# Patient Record
Sex: MALE | Race: WHITE | NOT HISPANIC OR LATINO | Employment: FULL TIME | URBAN - METROPOLITAN AREA
[De-identification: names, ages, dates, MRNs, and addresses within clinical notes are randomized per-mention and may not be internally consistent; named-entity substitution may affect disease eponyms.]

---

## 2017-05-19 ENCOUNTER — OFFICE VISIT (OUTPATIENT)
Dept: UROLOGY | Facility: CLINIC | Age: 42
End: 2017-05-19
Payer: COMMERCIAL

## 2017-05-19 VITALS
TEMPERATURE: 99 F | HEART RATE: 70 BPM | DIASTOLIC BLOOD PRESSURE: 90 MMHG | SYSTOLIC BLOOD PRESSURE: 144 MMHG | HEIGHT: 71 IN | BODY MASS INDEX: 33.88 KG/M2 | WEIGHT: 242 LBS

## 2017-05-19 DIAGNOSIS — N47.1 PHIMOSIS: Primary | ICD-10-CM

## 2017-05-19 DIAGNOSIS — E11.628 TYPE 2 DIABETES MELLITUS WITH OTHER SKIN COMPLICATION: ICD-10-CM

## 2017-05-19 DIAGNOSIS — I10 ESSENTIAL HYPERTENSION: ICD-10-CM

## 2017-05-19 PROCEDURE — 1160F RVW MEDS BY RX/DR IN RCRD: CPT | Mod: S$GLB,,, | Performed by: UROLOGY

## 2017-05-19 PROCEDURE — 3080F DIAST BP >= 90 MM HG: CPT | Mod: S$GLB,,, | Performed by: UROLOGY

## 2017-05-19 PROCEDURE — 99204 OFFICE O/P NEW MOD 45 MIN: CPT | Mod: 25,S$GLB,, | Performed by: UROLOGY

## 2017-05-19 PROCEDURE — 87086 URINE CULTURE/COLONY COUNT: CPT

## 2017-05-19 PROCEDURE — 4010F ACE/ARB THERAPY RXD/TAKEN: CPT | Mod: S$GLB,,, | Performed by: UROLOGY

## 2017-05-19 PROCEDURE — 99999 PR PBB SHADOW E&M-EST. PATIENT-LVL III: CPT | Mod: PBBFAC,,, | Performed by: UROLOGY

## 2017-05-19 PROCEDURE — 3077F SYST BP >= 140 MM HG: CPT | Mod: S$GLB,,, | Performed by: UROLOGY

## 2017-05-19 PROCEDURE — 51798 US URINE CAPACITY MEASURE: CPT | Mod: S$GLB,,, | Performed by: UROLOGY

## 2017-05-19 RX ORDER — BETAMETHASONE DIPROPIONATE 0.5 MG/G
OINTMENT TOPICAL 2 TIMES DAILY
Qty: 1 TUBE | Refills: 0 | Status: SHIPPED | OUTPATIENT
Start: 2017-05-19 | End: 2017-05-29

## 2017-05-19 NOTE — PROGRESS NOTES
"HPI:  Bradley Martines is a 42 y.o. year old male that  presents with No chief complaint on file.  .  Patient refers himself to the office with foreskin problems.  He states that he has intermittent tightness in cracking of his foreskin.  He states this is been going on for 8 months..  He has no dysuria.  He does have history diabetes and is been on medication for this for 5 years    He has an okay strength of stream with occasional hesitancy..  Bladder scan postvoid residual the office today is okay at 28 cc    Chart review shows a GFR greater than 60 in November 2011.  In 2009 PSA was 0.21 and in 2009 urine culture was negative      Past Medical History:   Diagnosis Date    Allergy     Hypertension     Urinary tract infection      Social History     Social History    Marital status:      Spouse name: N/A    Number of children: N/A    Years of education: N/A     Occupational History    Not on file.     Social History Main Topics    Smoking status: Never Smoker    Smokeless tobacco: Not on file    Alcohol use Yes    Drug use: No    Sexual activity: Yes     Partners: Female     Other Topics Concern    Not on file     Social History Narrative    No narrative on file     History reviewed. No pertinent surgical history.  Family History   Problem Relation Age of Onset    Prostate cancer Neg Hx     Kidney disease Neg Hx            Review of Systems  The patient has no chest pains.  The patient has no shortness of breath  Patient wears  no     glasses.  All other review of systems are negative.      Physical Exam:  BP (!) 144/90  Pulse 70  Temp 98.8 °F (37.1 °C)  Ht 5' 11" (1.803 m)  Wt 109.8 kg (242 lb)  BMI 33.75 kg/m2  General appearance: alert, cooperative, no distress  Constitutional:Oriented to person, place, and time.appears well-developed and well-nourished.   HEENT: Normocephalic, atraumatic, neck symmetrical, no nasal discharge   Eyes: conjunctivae/corneas clear, PERRL, EOM's " intact  Lungs: clear to auscultation bilaterally, no dullness to percussion bilaterally  Heart: regular rate and rhythm without rub; no displacement of the PMI   Abdomen: soft, non-tender; bowel sounds normoactive; no organomegaly  :Penis/perineum without lesions, scrotum without rash/cysts, epididymis nontender bilaterally, urethral meatus in normal location normal size, no penile plaques palpated, prostate:     Smooth and small                  seminal vesicles not palpated.  No rectal masses, sphincter tone normal.  Testes equal in size without masses.  Minimal tightness of foreskin with no evidence of balanitis  Extremities: extremities symmetric; no clubbing, cyanosis, or edema  Integument: Skin color, texture, turgor normal; no rashes; hair distrubution normal  Neurologic: Alert and oriented X 3, normal strength, normal coordination and gait  Psychiatric: no pressured speech; normal affect; no evidence of impaired cognition     LABS:    Complete Blood Count  Lab Results   Component Value Date    RBC 5.82 06/19/2009    HGB 16.8 06/19/2009    HCT 49.6 06/19/2009    MCV 85.2 06/19/2009    MCH 28.9 06/19/2009    MCHC 33.9 06/19/2009    RDW 12.5 06/19/2009     06/19/2009    MPV 9.8 06/19/2009    GRAN 7.2 06/19/2009    GRAN 61.2 06/19/2009    LYMPH 31.7 06/19/2009    LYMPH 3.7 06/19/2009    MONO 4.8 06/19/2009    MONO 0.6 06/19/2009    EOS 0.2 06/19/2009    BASO 0.1 06/19/2009    EOSINOPHIL 1.5 06/19/2009    BASOPHIL 0.8 06/19/2009       Comprehensive Metabolic Panel  Lab Results   Component Value Date     11/01/2011    BUN 10 11/01/2011    CREATININE 0.9 11/01/2011     11/01/2011    K 4.5 11/01/2011     11/01/2011    PROT 7.8 11/01/2011    ALBUMIN 4.0 11/01/2011    BILITOT 0.8 11/01/2011    AST 18 11/01/2011    ALKPHOS 118 11/01/2011    CO2 29 11/01/2011    ALT 25 11/01/2011    ANIONGAP 7 (L) 11/01/2011    EGFRNONAA >60 11/01/2011    ESTGFRAFRICA >60 11/01/2011       PSA  Lab Results    Component Value Date    PSA 0.21 06/19/2009         Assessment:    ICD-10-CM ICD-9-CM    1. Phimosis N47.1 605 Urine culture   2. Essential hypertension I10 401.9    3. Type 2 diabetes mellitus with other skin complication E11.628 250.80 Bladder scan     709.8      The primary encounter diagnosis was Phimosis. Diagnoses of Essential hypertension and Type 2 diabetes mellitus with other skin complication were also pertinent to this visit.      Plan: 1.  Phimosis.  Plan.  I discussed the association between phimosis and diabetes.  Discussed options of management including proceeding to circumcision versus intermittent treatment with betamethasone cream.  Patient wants to use the betamethasone cream.  Prescription written and patient instructed on its use.  Follow-up 3 months    #2.Elevated blood pressure.  Plan.  This finding pointed out to the patient.  I recommend that the patient follow up with the patient's PCP for this.    #3.  Diabetes.Plan.  Low bladder ultrasound postvoid residual in the office today rules out the presence of a neurogenic bladder due to this or these entities.  Orders Placed This Encounter   Procedures    Urine culture    Bladder scan           Babar Rosario MD    PLEASE NOTE:  Please be advised that portions of this note were dictated using voice recognition software and may contain dictation related errors in spelling/grammar/appropriate pronouns/syntax or other errors that might have not been found and or corrected on text review.

## 2017-05-19 NOTE — MR AVS SNAPSHOT
[facility]  [address] [place] [address] Ave  [place] [address]  Phone: [phone]                  Bradley Martines   2017 8:30 AM   Office Visit    Description:  Male : 1975   Provider:  Babar Rosario MD   Department:  [facility]           Diagnoses this Visit        Comments    Phimosis    -  Primary     Essential hypertension         Type 2 diabetes mellitus with other skin complication                To Do List           Future Appointments        Provider Department Dept Phone    2017 9:00 AM MD Barron Menjivar - Endo/Diab/Metab [phone]    2017 9:30 AM KATY [facility] - Orthopedics [phone]    2017 8:00 AM Babar Rosario MD [facility] [phone]      Goals (5 Years of Data)     None      Follow-Up and Disposition     Return in about 3 months (around 2017).       These Medications        Disp Refills Start End    betamethasone dipropionate (DIPROLENE) 0.05 % ointment 1 Tube 0 2017    Apply topically 2 (two) times daily. - Topical (Top)    Pharmacy: [facility] [address] [facility] #: [phone]         [facility]     [facility] Care [facility] -  Assistance  Unless otherwise directed by your provider, please contact [facility], our nurse care line that is available for  assistance.     Registered nurses in the [facility] provide: appointment scheduling, clinical advisement, health education, and other advisory services.  Call: [phone] (toll free)               Medications           Message regarding Medications     Verify the changes and/or additions to your medication regime listed below are the same as discussed with your clinician today.  If any of these changes or additions are incorrect, please notify your healthcare provider.        START taking these NEW medications      "   Refills    betamethasone dipropionate (DIPROLENE) 0.05 % ointment 0    Sig: Apply topically 2 (two) times daily.    Class: Normal    Route: Topical (Top)      STOP taking these medications     simvastatin (ZOCOR) 20 MG tablet Take 1 tablet by mouth Daily.    aspirin (ECOTRIN) 81 MG EC tablet Take 1 tablet by mouth Daily.           Verify that the below list of medications is an accurate representation of the medications you are currently taking.  If none reported, the list may be blank. If incorrect, please contact your healthcare provider. Carry this list with you in case of emergency.           Current Medications     lisinopril (PRINIVIL,ZESTRIL) 5 MG tablet Take 1 tablet by mouth Daily.    metformin (GLUCOPHAGE) 1000 MG tablet Take 1 tablet by mouth Twice daily.    betamethasone dipropionate (DIPROLENE) 0.05 % ointment Apply topically 2 (two) times daily.           Clinical Reference Information           Your Vitals Were     BP Pulse Temp Height Weight BMI    144/90 70 98.8 °F (37.1 °C) 5' 11" (1.803 m) 109.8 kg (242 lb) 33.75 kg/m2      Blood Pressure          Most Recent Value    BP  (!)  144/90      Allergies as of 5/19/2017     Naproxen      Immunizations Administered on Date of Encounter - 5/19/2017     None      Orders Placed During Today's Visit      Normal Orders This Visit    Bladder scan     Urine culture       Language Assistance Services     ATTENTION: Language assistance services are available, free of charge. Please call [phone].      ATENCIÓN: Si habla rey, tiene a cai disposición servicios gratuitos de asistencia lingüística. Llame al [phone].     [facility] Ý: N?u b?n nói Ti?ng Vi?t, có các d?ch v? h? tr? ngôn ng? mi?n phí dành cho b?n. G?i s? [phone].         [facility] complies with applicable Federal civil rights laws and does not discriminate on the basis of race, color, national origin, age, disability, or sex.        "

## 2017-05-20 LAB — BACTERIA UR CULT: NO GROWTH

## 2017-05-23 ENCOUNTER — OFFICE VISIT (OUTPATIENT)
Dept: ENDOCRINOLOGY | Facility: CLINIC | Age: 42
End: 2017-05-23
Payer: COMMERCIAL

## 2017-05-23 ENCOUNTER — LAB VISIT (OUTPATIENT)
Dept: LAB | Facility: HOSPITAL | Age: 42
End: 2017-05-23
Attending: INTERNAL MEDICINE
Payer: COMMERCIAL

## 2017-05-23 VITALS
WEIGHT: 235.69 LBS | HEIGHT: 71 IN | SYSTOLIC BLOOD PRESSURE: 140 MMHG | HEART RATE: 74 BPM | DIASTOLIC BLOOD PRESSURE: 90 MMHG | BODY MASS INDEX: 33 KG/M2

## 2017-05-23 DIAGNOSIS — E11.9 TYPE 2 DIABETES MELLITUS WITHOUT COMPLICATION, WITHOUT LONG-TERM CURRENT USE OF INSULIN: ICD-10-CM

## 2017-05-23 DIAGNOSIS — I10 ESSENTIAL HYPERTENSION: ICD-10-CM

## 2017-05-23 DIAGNOSIS — E78.5 HYPERLIPIDEMIA, UNSPECIFIED HYPERLIPIDEMIA TYPE: ICD-10-CM

## 2017-05-23 LAB
CREAT UR-MCNC: 90 MG/DL
MICROALBUMIN UR DL<=1MG/L-MCNC: 24 UG/ML
MICROALBUMIN/CREATININE RATIO: 26.7 UG/MG

## 2017-05-23 PROCEDURE — 99999 PR PBB SHADOW E&M-EST. PATIENT-LVL V: CPT | Mod: PBBFAC,,, | Performed by: INTERNAL MEDICINE

## 2017-05-23 PROCEDURE — 99204 OFFICE O/P NEW MOD 45 MIN: CPT | Mod: S$GLB,,, | Performed by: INTERNAL MEDICINE

## 2017-05-23 PROCEDURE — 82570 ASSAY OF URINE CREATININE: CPT

## 2017-05-23 PROCEDURE — 4010F ACE/ARB THERAPY RXD/TAKEN: CPT | Mod: S$GLB,,, | Performed by: INTERNAL MEDICINE

## 2017-05-23 PROCEDURE — 3045F PR MOST RECENT HEMOGLOBIN A1C LEVEL 7.0-9.0%: CPT | Mod: S$GLB,,, | Performed by: INTERNAL MEDICINE

## 2017-05-23 RX ORDER — ATORVASTATIN CALCIUM 40 MG/1
40 TABLET, FILM COATED ORAL DAILY
Qty: 30 TABLET | Refills: 11 | Status: SHIPPED | OUTPATIENT
Start: 2017-05-23

## 2017-05-23 RX ORDER — METFORMIN HYDROCHLORIDE 1000 MG/1
1000 TABLET ORAL 2 TIMES DAILY WITH MEALS
Qty: 180 TABLET | Refills: 3 | Status: SHIPPED | OUTPATIENT
Start: 2017-05-23 | End: 2018-05-23

## 2017-05-23 RX ORDER — LOSARTAN POTASSIUM 100 MG/1
100 TABLET ORAL DAILY
Qty: 30 TABLET | Refills: 11 | Status: SHIPPED | OUTPATIENT
Start: 2017-05-23 | End: 2022-01-18

## 2017-05-23 NOTE — PROGRESS NOTES
"Subjective:       Patient ID: Bradley Martines is a 42 y.o. male.    Chief Complaint: Diabetes Mellitus    HPI      Bradley Martines is here for the first time for type Diabetes 2 management, this was diagnosed in      Currently has nocturia two times a night and polyuria blurred vision.    FBG in office: 141    Diabetes medications include:  Metformin-takes 2x a week  Denies any side effects     Diabetes complications:  Last eye evaluation: 2016   Denies numbness, tingling sensation, symptomatic CAD or CVD or kidney disease.      24-h dietary recall:  Breakfast -Cereal  Lunch -Burger  Dinner -Burger    Diabetes education: None    SMBG: Tests BG times periodically when "bg is high"  FB-200      Exercise: no formal exercise  [place]    ADA STANDARDS of CARE:        ACE inhibitor of angiotensin II receptor blocker: losartan 100 mg daily         Statin drug:  Simvastatin 40 mg daily        Low dose ASA: none        Eye exam within last year: 2016         Dental exam: 2016        Flu shot: 2015        Pneumonia vaccine: n/a        Microalbumin: none    Past Medical History:  Hypertension  Dyslipidemia        Review of Systems   Constitutional: Negative for unexpected weight change.   Eyes: Negative for visual disturbance.   Respiratory: Negative for shortness of breath.    Cardiovascular: Negative for chest pain.   Gastrointestinal: Negative for abdominal pain.   Endocrine: Positive for polydipsia and polyuria.   Musculoskeletal: Positive for myalgias.   Skin: Negative for wound.   Neurological: Positive for headaches.   Hematological: Does not bruise/bleed easily.   Psychiatric/Behavioral: Negative for sleep disturbance.       Objective:      Physical Exam   Constitutional: He appears well-developed.   HENT:   Right Ear: External ear normal.   Left Ear: External ear normal.   Nose: Nose normal.   Hearing normal    Neck: No tracheal deviation present. No thyromegaly present.   Cardiovascular: " Normal rate.    No murmur heard.  Pulmonary/Chest: Effort normal and breath sounds normal.   Abdominal: Soft. He exhibits no mass.   No hernia noted   Musculoskeletal: He exhibits no edema.        Right foot: There is no deformity.        Left foot: There is no deformity.   Feet:   Right Foot:   Skin Integrity: Negative for ulcer.   Left Foot:   Skin Integrity: Negative for ulcer.   Neurological: He is alert. No cranial nerve deficit or sensory deficit. Coordination and gait normal.        Skin: No rash noted.   +acanthosis  No supraclavicular fulness       Psychiatric: He has a normal mood and affect. Judgment normal.   Vitals reviewed.      Assessment:       Bradley was seen today for diabetes mellitus.    Diagnoses and all orders for this visit:    Type 2 diabetes mellitus without complication, without long-term current use of insulin    Essential hypertension    Hyperlipidemia, unspecified hyperlipidemia type          Plan:       Type 2 diabetes mellitus without complication, without long-term current use of insulin  -Start Metformin 1000 mg daily and then titrate up to metformin 1000 mg bid  -microalbumin, a1c, cmp, tsh, lipid profile  -podiatry and optometry appt, diabetes education  -on statin and arb  -Will f/u in 3 months in NP clinic if A1c not at goal will consider incretin therapy.    Essential hypertension  B/p elevated 140/80  Continue losartan 100 mg daily  Stressed medication compliance     Hyperlipidemia, unspecified hyperlipidemia type  -change simvastatin to lipitor 40 mg daily      MELANIE. Ana Cristina St MD  PGY 5  Endocrinology    I, Yas Middleton MD,  have personally taken the history and examined the patient and agree with the resident's note as stated above.    He is very motivated.

## 2017-05-24 ENCOUNTER — OFFICE VISIT (OUTPATIENT)
Dept: ORTHOPEDICS | Facility: CLINIC | Age: 42
End: 2017-05-24
Payer: COMMERCIAL

## 2017-05-24 ENCOUNTER — HOSPITAL ENCOUNTER (OUTPATIENT)
Dept: RADIOLOGY | Facility: HOSPITAL | Age: 42
Discharge: HOME | End: 2017-05-24
Attending: ORTHOPAEDIC SURGERY
Payer: COMMERCIAL

## 2017-05-24 VITALS — BODY MASS INDEX: 32.44 KG/M2 | WEIGHT: 231.69 LBS | HEIGHT: 71 IN

## 2017-05-24 DIAGNOSIS — M22.42 CHONDROMALACIA, PATELLA, LEFT: Primary | ICD-10-CM

## 2017-05-24 DIAGNOSIS — M25.532 LEFT WRIST PAIN: ICD-10-CM

## 2017-05-24 DIAGNOSIS — M25.562 LEFT KNEE PAIN, UNSPECIFIED CHRONICITY: ICD-10-CM

## 2017-05-24 DIAGNOSIS — M77.8 LEFT WRIST TENDINITIS: ICD-10-CM

## 2017-05-24 PROCEDURE — 73110 X-RAY EXAM OF WRIST: CPT | Mod: TC,LT

## 2017-05-24 PROCEDURE — 99999 PR PBB SHADOW E&M-EST. PATIENT-LVL IV: CPT | Mod: PBBFAC,,, | Performed by: PHYSICIAN ASSISTANT

## 2017-05-24 PROCEDURE — 73110 X-RAY EXAM OF WRIST: CPT | Mod: 26,LT,, | Performed by: RADIOLOGY

## 2017-05-24 PROCEDURE — 73564 X-RAY EXAM KNEE 4 OR MORE: CPT | Mod: TC,LT

## 2017-05-24 PROCEDURE — 99204 OFFICE O/P NEW MOD 45 MIN: CPT | Mod: S$GLB,,, | Performed by: PHYSICIAN ASSISTANT

## 2017-05-24 PROCEDURE — 73562 X-RAY EXAM OF KNEE 3: CPT | Mod: 26,59,RT, | Performed by: RADIOLOGY

## 2017-05-24 PROCEDURE — 73564 X-RAY EXAM KNEE 4 OR MORE: CPT | Mod: 26,LT,, | Performed by: RADIOLOGY

## 2017-05-24 RX ORDER — DICLOFENAC SODIUM 10 MG/G
2 GEL TOPICAL 2 TIMES DAILY
Qty: 1 TUBE | Refills: 3 | Status: SHIPPED | OUTPATIENT
Start: 2017-05-24

## 2017-05-24 RX ORDER — MELOXICAM 15 MG/1
15 TABLET ORAL DAILY
Qty: 30 TABLET | Refills: 2 | Status: SHIPPED | OUTPATIENT
Start: 2017-05-24 | End: 2017-06-23

## 2017-05-24 NOTE — PROGRESS NOTES
Subjective:      Patient ID: Bradley Martines is a 42 y.o. male.    Chief Complaint: No chief complaint on file.    HPI  42 year old male presents with chief complaint of left wrist pain x 6 months. He is RHD and denies trauma. He reports intermittent sharp pain at the ulnar side. It is worse with turning the wrist and picking things up. He reports some weakness when picking up heavy items. He took ibuprofen with no relief. He denies swelling and N/T. He says the MF and RF occasionally get stuck.   Patient reports left knee pain x 1 month. He denies trauma. Pain is at the lateral aspect. It is worse with stairs, going from sit to stand, and in the mornings. He does not take any medicine for it. He reports popping. He denies swelling, locking, catching, and giving way.   Review of Systems   Constitution: Negative for chills, fever and night sweats.   Cardiovascular: Negative for chest pain.   Respiratory: Negative for cough and shortness of breath.    Hematologic/Lymphatic: Does not bruise/bleed easily.   Skin: Negative for color change.   Gastrointestinal: Negative for heartburn.   Genitourinary: Negative for dysuria.   Neurological: Negative for numbness and paresthesias.   Psychiatric/Behavioral: Negative for altered mental status.   Allergic/Immunologic: Negative for persistent infections.         Objective:                    Left Hand/Wrist Exam     Inspection   Effusion: Wrist - absent   Deformity: Wrist - absent     Tenderness   The patient is tender to palpation of the ulnar area.     Range of Motion     Wrist   Extension: normal   Flexion: normal   Pronation: normal   Supination: normal     Tests   Tinels Sign (Medial Nerve): negative      Other     Sensory Exam  Median Distribution: normal  Ulnar Distribution: normal  Radial Distribution: normal    Comments:  TTP ulnar wrist. Minimal TTP MF A1 pulley. No triggering on exam.           Muscle Strength   Left Upper Extremity  :  5/5/5     Vascular Exam        Capillary Refill  Left Hand: normal capillary refill    General :   alert, appears stated age and cooperative   Gait: Normal. The patient can bear weight on the injured extremity.   Left Lower Extremity  Hip Palpation:  no tenderness over the greater  trochanter   Hip ROM: 100% of normal    Knee Effusion:  None.   Ecchymosis:  none   Knee ROM:  0 to 130 degrees without subpatellar   crepitance.   Patella:  Patella does track normally.  Patellar apprehension test: negative  Patellar compression test: negative   Tenderness: lateral facet of the patella   Stability:  Lachman's test: negative  Posterior drawer: negative  Medial collateral ligament: negative  Lateral collateral ligament: negative         Rivera's Test:  minimal pain   Sensation:   intact to light touch   Pulses: normal DP and PT pulses         X-ray knee: ordered and reviewed by myself. No fx or dislocation.  X-ray wrist: ordered and reviewed by myself. No evidence for acute fracture dislocation left wrist. No focal bone erosion. Ulnar minus variant. Otherwise articular spaces relatively maintained.        Assessment:       Encounter Diagnoses   Name Primary?    Chondromalacia, patella, left Yes    Left wrist tendinitis           Plan:       Discussed treatment options with patient. Prescription for mobic and voltaren gel sent to pharmacy. Wrist brace given to wear as needed. Order placed for PT. RTC if symptoms worsen or do not improve.

## 2018-07-06 RX ORDER — LOSARTAN POTASSIUM 100 MG/1
100 TABLET ORAL DAILY
Qty: 30 TABLET | Refills: 11 | OUTPATIENT
Start: 2018-07-06 | End: 2018-08-05

## 2018-07-06 RX ORDER — METFORMIN HYDROCHLORIDE 1000 MG/1
1000 TABLET ORAL 2 TIMES DAILY WITH MEALS
Qty: 180 TABLET | Refills: 3 | OUTPATIENT
Start: 2018-07-06 | End: 2019-07-06

## 2019-11-29 ENCOUNTER — HOSPITAL ENCOUNTER (EMERGENCY)
Facility: HOSPITAL | Age: 44
Discharge: HOME | End: 2019-11-29
Attending: EMERGENCY MEDICINE

## 2019-11-29 VITALS
WEIGHT: 235 LBS | OXYGEN SATURATION: 99 % | BODY MASS INDEX: 31.14 KG/M2 | DIASTOLIC BLOOD PRESSURE: 70 MMHG | HEIGHT: 73 IN | TEMPERATURE: 98 F | RESPIRATION RATE: 16 BRPM | SYSTOLIC BLOOD PRESSURE: 149 MMHG | HEART RATE: 83 BPM

## 2019-11-29 DIAGNOSIS — M79.89 LEG SWELLING: ICD-10-CM

## 2019-11-29 DIAGNOSIS — M25.569 KNEE PAIN: ICD-10-CM

## 2019-11-29 DIAGNOSIS — S83.90XA SPRAIN OF KNEE, UNSPECIFIED LATERALITY, UNSPECIFIED LIGAMENT, INITIAL ENCOUNTER: Primary | ICD-10-CM

## 2019-11-29 PROCEDURE — 99284 EMERGENCY DEPT VISIT MOD MDM: CPT | Mod: 25

## 2019-11-29 RX ORDER — IBUPROFEN 600 MG/1
600 TABLET ORAL EVERY 6 HOURS PRN
Qty: 20 TABLET | Refills: 0 | Status: SHIPPED | OUTPATIENT
Start: 2019-11-29

## 2019-11-30 NOTE — ED PROVIDER NOTES
"Encounter Date: 11/29/2019    SCRIBE #1 NOTE: I, Betty Cary, am scribing for, and in the presence of,  Dr. Lefort. I have scribed the entire note.       History     Chief Complaint   Patient presents with    Knee Pain     To ER with c/o left knee pain after pulling it at work on tuesday.     44-year-old male presents to the ED for left knee pain s/p heavy lifting. Patient was lifting a heavy box on Tuesday when he heard a "pop". Patient has been experiencing gradually worsening pain with ambulation and unable to fully flex/extend. He denies previous injury/trauma to the site, significant joint swelling, color change, numbness/tingling, difficulty with weight bearing, or any other injury.     The history is provided by the patient.     Review of patient's allergies indicates:   Allergen Reactions    Naproxen Itching     Past Medical History:   Diagnosis Date    Allergy     Diabetes mellitus, type 2     Hyperlipidemia     Hypertension     Type 2 diabetes mellitus without complication, without long-term current use of insulin 5/23/2017    Urinary tract infection      Past Surgical History:   Procedure Laterality Date    APPENDECTOMY       Family History   Problem Relation Age of Onset    Diabetes Mother     Hypertension Mother     Prostate cancer Neg Hx     Kidney disease Neg Hx      Social History     Tobacco Use    Smoking status: Never Smoker   Substance Use Topics    Alcohol use: Yes    Drug use: No     Review of Systems   Constitutional: Negative for chills and fever.   HENT: Negative for congestion, ear pain, rhinorrhea and sore throat.    Respiratory: Negative for cough and shortness of breath.    Cardiovascular: Negative for chest pain.   Gastrointestinal: Negative for abdominal pain, diarrhea, nausea and vomiting.   Genitourinary: Negative for dysuria and hematuria.   Musculoskeletal: Positive for arthralgias. Negative for myalgias and neck pain.   Skin: Negative for rash.   Neurological: " Negative for dizziness, weakness, light-headedness and headaches.   Psychiatric/Behavioral: Negative for confusion.       Physical Exam     Initial Vitals [11/29/19 1955]   BP Pulse Resp Temp SpO2   (!) 150/93 95 18 98.3 °F (36.8 °C) 98 %      MAP       --         Physical Exam    Nursing note and vitals reviewed.  Constitutional: He appears well-developed and well-nourished. No distress.   HENT:   Head: Normocephalic and atraumatic.   Mouth/Throat: Oropharynx is clear and moist.   Eyes: Conjunctivae and EOM are normal.   Cardiovascular: Normal rate, regular rhythm, normal heart sounds and intact distal pulses.   Pulmonary/Chest: Breath sounds normal. No respiratory distress.   Abdominal: Soft. He exhibits no distension. There is no tenderness.   Musculoskeletal: Normal range of motion. He exhibits no edema or tenderness.   Swelling with decreased ROM    Neurological: He is alert and oriented to person, place, and time. He has normal strength.   Skin: Skin is warm and dry.         ED Course   Procedures  Labs Reviewed - No data to display       Imaging Results          X-Ray Knee 3 View Left (Final result)  Result time 11/29/19 22:26:55    Final result by George Murray MD (11/29/19 22:26:55)                 Impression:      No acute bony abnormality.      Electronically signed by: George Murray MD  Date:    11/29/2019  Time:    22:26             Narrative:    EXAMINATION:  XR KNEE 3 VIEW LEFT    CLINICAL HISTORY:  Pain in unspecified knee.    TECHNIQUE:  Three views of the left knee were performed.    COMPARISON:  05/24/2017.    FINDINGS:  No evidence of acute fracture or dislocation.  Soft tissues are symmetric.  No large joint effusion.  No unexpected radiopaque foreign body.                               US Lower Extremity Veins Bilateral (Final result)  Result time 11/29/19 21:56:50    Final result by Maricruz Rae MD (11/29/19 21:56:50)                 Impression:      No evidence of deep venous  thrombosis in either lower extremity.      Electronically signed by: Maricruz Rae  Date:    11/29/2019  Time:    21:56             Narrative:    EXAMINATION:  ULTRASOUND LOWER EXTREMITY VEINS BILATERAL    CLINICAL HISTORY:  Other specified soft tissue disorders    TECHNIQUE:  Duplex and color flow Doppler and dynamic compression was performed of the bilateral lower extremity veins was performed.    COMPARISON:  None    FINDINGS:  Right thigh veins: The common femoral, femoral, popliteal, upper greater saphenous, and deep femoral veins are patent and free of thrombus. The veins are normally compressible and have normal phasic flow and augmentation response.    Right calf veins: The visualized calf veins are patent.    Left thigh veins: The common femoral, femoral, popliteal, upper greater saphenous, and deep femoral veins are patent and free of thrombus. The veins are normally compressible and have normal phasic flow and augmentation response.    Left calf veins: The visualized calf veins are patent.    Miscellaneous: None                                 Medical Decision Making:   Differential Diagnosis:   Fracture sprain ligamentous or meniscal injury Alexander cyst DVT  Independently Interpreted Test(s):   I have ordered and independently interpreted X-rays - see prior notes.  Clinical Tests:   Radiological Study: Reviewed and Ordered  ED Management:  Imaging unremarkable for fracture or fluid collection clot.  Given mechanism of injury will place a knee immobilizer and crutches with orthopedic follow-up.  Discussed mobility restrictions, indications to return, reports of continued follow-up.                   ED Course as of Nov 29 2255 Fri Nov 29, 2019 2131 Sort note: Bradley Martines nontoxic/afebrile 44 y.o.  presented to the ED with c/o left knee pain. Onset Tuesday after lifting heavy box.     Patient seen and medically screened by Physician assistant in Sort process due to ED crowding.  Appropriate tests  and/or medications ordered.  Care transferred to an alternate provider when patient was placed in an Exam Room from the [facility] for physical exam, additional orders, and disposition. 9:31 PM. Guy Lefort, MD    [MM]      ED Course User Index  [MM] Betty Cary                Clinical Impression:       ICD-10-CM ICD-9-CM   1. Sprain of knee, unspecified laterality, unspecified ligament, initial encounter S83.90XA 844.9   2. Knee pain M25.569 719.46   3. Leg swelling M79.89 729.81     Disposition:   Disposition: Discharged  Condition: Stable      Scribe Attestation I, Dr. Guy Lefort, personally performed the services described in this documentation. All medical record entries made by the scribe were at my direction and in my presence. I have reviewed the chart and agree that the record reflects my personal performance and is accurate and complete. Guy Lefort, MD.  6:12 AM 12/01/2019                  Guy J. Lefort, MD  12/01/19 0613

## 2019-11-30 NOTE — ED NOTES
"Pt presents to ED secondary to left knee pain s/p heavy lifting. States he heard a "Pop" when lifting a heavy box on Tuesday. Worsening pain with ambulation and unable to fully flex/extend. Partial weight bearing noted. NAD noted.     APPEARANCE: Alert, oriented and in no acute distress.  CARDIAC: Normal rate   PERIPHERAL VASCULAR: peripheral pulses present. Normal cap refill. No edema. Warm to touch.    RESPIRATORY:Normal rate and effort. Respirations are equal and unlabored no obvious signs of distress.  MUSC: Full ROM. No bony tenderness or soft tissue tenderness. No obvious deformity.  SKIN: Skin is warm and dry, normal skin turgor, mucous membranes moist.    "

## 2020-03-26 ENCOUNTER — NURSE TRIAGE (OUTPATIENT)
Dept: ADMINISTRATIVE | Facility: CLINIC | Age: 45
End: 2020-03-26

## 2020-03-26 NOTE — TELEPHONE ENCOUNTER
Pt states he tested positive for coronavirus. Pt states his symptoms were loss of taste and smell, body aches, and slight cough. Pt denies fever or SOB. Pt has questions regarding what he is supposed to do. Pt advised to contact PCP and refer to CDC website. Pt verbalizes understanding.     Reason for Disposition   [1] Caller requesting NON-URGENT health information AND [2] PCP's office is the best resource    Additional Information   Negative: RN needs further essential information from caller in order to complete triage   Negative: Requesting regular office appointment    Protocols used: INFORMATION ONLY CALL-A-AH

## 2020-03-26 NOTE — TELEPHONE ENCOUNTER
Called x2. Unable to leave voicemail.     Reason for Disposition   No answer.  First attempt to contact caller.  Follow-up call scheduled within 15 minutes.    Protocols used: ST NO CONTACT OR DUPLICATE CONTACT CALL-A-AH

## 2020-03-29 ENCOUNTER — NURSE TRIAGE (OUTPATIENT)
Dept: ADMINISTRATIVE | Facility: CLINIC | Age: 45
End: 2020-03-29

## 2020-03-29 NOTE — TELEPHONE ENCOUNTER
Reason for Disposition   [1] Diagnosed with Coronavirus Disease (COVID-19) AND [2] questions about home isolation    Additional Information   Negative: Severe difficulty breathing (e.g., struggling for each breath, speak in single words, bluish lips)   Negative: Sounds like a life-threatening emergency to the triager   Negative: [1] Difficulty breathing (shortness of breath) occurs AND [2] onset > 14 days after COVID-19 EXPOSURE (Close Contact)   Negative: [1] Dry cough occurs AND [2] onset > 14 days after COVID-19 EXPOSURE   Negative: [1] Wet cough (i.e., white-yellow, yellow, green, or tamanna colored sputum) AND [2] onset > 14 days after COVID-19 EXPOSURE   Negative: [1] Common cold symptoms AND [2] onset > 14 days after COVID-19 EXPOSURE   Negative: [1] Difficulty breathing occurs AND [2] within 14 days of COVID-19 EXPOSURE (Close Contact)   Negative: Patient sounds very sick or weak to the triager   Negative: [1] Fever or feeling feverish AND [2] within 14 Days of COVID-19 EXPOSURE (Close Contact)   Negative: [1] Cough occurs AND [2] within 14 days of COVID-19 EXPOSURE   Negative: [1] Fever (or feeling feverish) OR cough occurs AND [2] travel from or living in high risk area (identified by CDC) AND [3] within last 14 days   Negative: [1] COVID-19 EXPOSURE within last 14 days AND [2] mild body aches, chills, diarrhea, headache, runny nose, or sore throat occur   Negative: [1] COVID-19 EXPOSURE within last 14 days AND [2] NO cough, fever, or breathing difficulty AND [3] exposed person is a healthcare worker who was NOT using all recommended personal protective equipment (i.e., a respirator-N95 mask, eye protection, gloves, and gown)   Negative: [1] COVID-19 EXPOSURE (Close Contact) within last 14 days AND [2] NO cough, fever, or breathing difficulty   Negative: [1] Travel from or living in high risk area (identified by CDC) AND [2] within last 14 days AND [3] NO cough or fever or breathing  difficulty   Negative: [1] COVID-19 EXPOSURE (Close Contact) AND [2] 15 or more days ago AND [3] NO cough or fever or breathing difficulty   Negative: [1] No COVID-19 EXPOSURE BUT [2] living with someone who was exposed and who has no fever or cough   Negative: [1] Caller concerned that exposure to COVID-19 occurred BUT [2] does not meet COVID-19 EXPOSURE criteria from CDC   Negative: COVID-19 testing, questions about who needs it   Negative: [1] No COVID-19 EXPOSURE BUT [2] questions about    Protocols used: CORONAVIRUS (COVID-19) EXPOSURE-A-AH  pt called re testing site at [facility] Monday 3/23. got a call + corona virus. [facility] doing labs. Lost sense of smell and taste since last wed 3/25. afeb. coughing, No SOB. hx DM. Pt has kids coughing, afeb. 12 you with asthma. Has been in touch with doctor with appt 4/21. Dxd with bronchitis via telemedicine recently. rec to follow up with [facility] for lab results to show employer, follow up with doctor in am for any treatment. Stay home quarantined until free to go to work by doctor, wash hands, clean high touch surfaces. Call back with questions

## 2021-04-16 ENCOUNTER — PATIENT MESSAGE (OUTPATIENT)
Dept: RESEARCH | Facility: HOSPITAL | Age: 46
End: 2021-04-16

## 2022-01-18 ENCOUNTER — OFFICE VISIT (OUTPATIENT)
Dept: URGENT CARE | Facility: CLINIC | Age: 47
End: 2022-01-18
Payer: COMMERCIAL

## 2022-01-18 VITALS
RESPIRATION RATE: 18 BRPM | TEMPERATURE: 98 F | HEIGHT: 73 IN | OXYGEN SATURATION: 99 % | SYSTOLIC BLOOD PRESSURE: 132 MMHG | DIASTOLIC BLOOD PRESSURE: 74 MMHG | BODY MASS INDEX: 31.14 KG/M2 | HEART RATE: 74 BPM | WEIGHT: 235 LBS

## 2022-01-18 DIAGNOSIS — R39.11 URINARY HESITANCY: ICD-10-CM

## 2022-01-18 DIAGNOSIS — R30.0 DYSURIA: ICD-10-CM

## 2022-01-18 DIAGNOSIS — N39.0 ACUTE UTI: ICD-10-CM

## 2022-01-18 DIAGNOSIS — N41.0 ACUTE PROSTATITIS: ICD-10-CM

## 2022-01-18 DIAGNOSIS — R50.9 FEVER, UNSPECIFIED FEVER CAUSE: Primary | ICD-10-CM

## 2022-01-18 LAB
BILIRUB UR QL STRIP: NEGATIVE
CTP QC/QA: YES
GLUCOSE UR QL STRIP: NEGATIVE
KETONES UR QL STRIP: NEGATIVE
LEUKOCYTE ESTERASE UR QL STRIP: POSITIVE
PH, POC UA: 5 (ref 5–8)
POC BLOOD, URINE: POSITIVE
POC NITRATES, URINE: POSITIVE
PROT UR QL STRIP: POSITIVE
SARS-COV-2 RDRP RESP QL NAA+PROBE: NEGATIVE
SP GR UR STRIP: 1 (ref 1–1.03)
UROBILINOGEN UR STRIP-ACNC: NORMAL (ref 0.3–2.2)

## 2022-01-18 PROCEDURE — 1160F PR REVIEW ALL MEDS BY PRESCRIBER/CLIN PHARMACIST DOCUMENTED: ICD-10-PCS | Mod: CPTII,S$GLB,, | Performed by: INTERNAL MEDICINE

## 2022-01-18 PROCEDURE — 81003 POCT URINALYSIS, DIPSTICK, AUTOMATED, W/O SCOPE: ICD-10-PCS | Mod: QW,S$GLB,, | Performed by: INTERNAL MEDICINE

## 2022-01-18 PROCEDURE — 3075F PR MOST RECENT SYSTOLIC BLOOD PRESS GE 130-139MM HG: ICD-10-PCS | Mod: CPTII,S$GLB,, | Performed by: INTERNAL MEDICINE

## 2022-01-18 PROCEDURE — 81003 URINALYSIS AUTO W/O SCOPE: CPT | Mod: QW,S$GLB,, | Performed by: INTERNAL MEDICINE

## 2022-01-18 PROCEDURE — 87086 URINE CULTURE/COLONY COUNT: CPT | Performed by: INTERNAL MEDICINE

## 2022-01-18 PROCEDURE — 1159F PR MEDICATION LIST DOCUMENTED IN MEDICAL RECORD: ICD-10-PCS | Mod: CPTII,S$GLB,, | Performed by: INTERNAL MEDICINE

## 2022-01-18 PROCEDURE — 3075F SYST BP GE 130 - 139MM HG: CPT | Mod: CPTII,S$GLB,, | Performed by: INTERNAL MEDICINE

## 2022-01-18 PROCEDURE — 87186 SC STD MICRODIL/AGAR DIL: CPT | Performed by: INTERNAL MEDICINE

## 2022-01-18 PROCEDURE — 96372 THER/PROPH/DIAG INJ SC/IM: CPT | Mod: S$GLB,,, | Performed by: INTERNAL MEDICINE

## 2022-01-18 PROCEDURE — 87088 URINE BACTERIA CULTURE: CPT | Performed by: INTERNAL MEDICINE

## 2022-01-18 PROCEDURE — 3078F DIAST BP <80 MM HG: CPT | Mod: CPTII,S$GLB,, | Performed by: INTERNAL MEDICINE

## 2022-01-18 PROCEDURE — 3008F BODY MASS INDEX DOCD: CPT | Mod: CPTII,S$GLB,, | Performed by: INTERNAL MEDICINE

## 2022-01-18 PROCEDURE — U0002 COVID-19 LAB TEST NON-CDC: HCPCS | Mod: QW,S$GLB,, | Performed by: INTERNAL MEDICINE

## 2022-01-18 PROCEDURE — 99214 PR OFFICE/OUTPT VISIT, EST, LEVL IV, 30-39 MIN: ICD-10-PCS | Mod: 25,S$GLB,, | Performed by: INTERNAL MEDICINE

## 2022-01-18 PROCEDURE — 3078F PR MOST RECENT DIASTOLIC BLOOD PRESSURE < 80 MM HG: ICD-10-PCS | Mod: CPTII,S$GLB,, | Performed by: INTERNAL MEDICINE

## 2022-01-18 PROCEDURE — 96372 PR INJECTION,THERAP/PROPH/DIAG2ST, IM OR SUBCUT: ICD-10-PCS | Mod: S$GLB,,, | Performed by: INTERNAL MEDICINE

## 2022-01-18 PROCEDURE — U0002: ICD-10-PCS | Mod: QW,S$GLB,, | Performed by: INTERNAL MEDICINE

## 2022-01-18 PROCEDURE — 99214 OFFICE O/P EST MOD 30 MIN: CPT | Mod: 25,S$GLB,, | Performed by: INTERNAL MEDICINE

## 2022-01-18 PROCEDURE — 3008F PR BODY MASS INDEX (BMI) DOCUMENTED: ICD-10-PCS | Mod: CPTII,S$GLB,, | Performed by: INTERNAL MEDICINE

## 2022-01-18 PROCEDURE — 1160F RVW MEDS BY RX/DR IN RCRD: CPT | Mod: CPTII,S$GLB,, | Performed by: INTERNAL MEDICINE

## 2022-01-18 PROCEDURE — 87077 CULTURE AEROBIC IDENTIFY: CPT | Performed by: INTERNAL MEDICINE

## 2022-01-18 PROCEDURE — 1159F MED LIST DOCD IN RCRD: CPT | Mod: CPTII,S$GLB,, | Performed by: INTERNAL MEDICINE

## 2022-01-18 RX ORDER — CIPROFLOXACIN 500 MG/1
500 TABLET ORAL 2 TIMES DAILY
Qty: 28 TABLET | Refills: 0 | Status: SHIPPED | OUTPATIENT
Start: 2022-01-18 | End: 2022-02-01

## 2022-01-18 RX ORDER — CEFTRIAXONE 1 G/1
1 INJECTION, POWDER, FOR SOLUTION INTRAMUSCULAR; INTRAVENOUS
Status: COMPLETED | OUTPATIENT
Start: 2022-01-18 | End: 2022-01-18

## 2022-01-18 RX ORDER — METFORMIN HYDROCHLORIDE 1000 MG/1
TABLET ORAL
COMMUNITY

## 2022-01-18 RX ADMIN — CEFTRIAXONE 1 G: 1 INJECTION, POWDER, FOR SOLUTION INTRAMUSCULAR; INTRAVENOUS at 02:01

## 2022-01-18 NOTE — PROGRESS NOTES
"Subjective:       Patient ID: Bradley Martines is a 46 y.o. male.    Vitals:  height is 6' 1" (1.854 m) and weight is 106.6 kg (235 lb). His temperature is 98 °F (36.7 °C). His blood pressure is 132/74 and his pulse is 74. His respiration is 18 and oxygen saturation is 99%.     Chief Complaint: Headache, Fever, and Dysuria (Started on Sunday )    Fever   This is a new problem. The current episode started in the past 7 days. The problem occurs constantly. The maximum temperature noted was 99 to 99.9 F. The temperature was taken using an oral thermometer. Associated symptoms include congestion, coughing, headaches, muscle aches and urinary pain. Pertinent negatives include no nausea, sore throat, vomiting or wheezing. He has tried NSAIDs for the symptoms. The treatment provided mild relief.   Risk factors: no sick contacts        Constitution: Positive for fever.   HENT: Positive for congestion. Negative for sore throat.    Respiratory: Positive for cough. Negative for wheezing.    Gastrointestinal: Negative for nausea and vomiting.   Genitourinary: Positive for dysuria, frequency and urgency.   Neurological: Positive for headaches.       Objective:      Physical Exam   Constitutional: normal  HENT:   Head: Normocephalic and atraumatic.   Abdominal: Normal appearance and bowel sounds are normal. Soft.   Genitourinary:         Comments: normal     Neurological: He is alert.   Nursing note and vitals reviewed.        Assessment:       1. Fever, unspecified fever cause    2. Dysuria    3. Urinary hesitancy    4. Acute UTI    5. Acute prostatitis          Plan:         Fever, unspecified fever cause  -     POCT COVID-19 Rapid Screening    Dysuria  -     POCT Urinalysis, Dipstick, Automated, W/O Scope    Urinary hesitancy    Acute UTI  -     Culture, Urine  -     Ambulatory referral/consult to Urology    Acute prostatitis  -     cefTRIAXone injection 1 g  -     ciprofloxacin HCl (CIPRO) 500 MG tablet; Take 1 tablet (500 mg " total) by mouth 2 (two) times daily. for 14 days  Dispense: 28 tablet; Refill: 0  -     Culture, Urine  -     Ambulatory referral/consult to Urology

## 2022-01-21 ENCOUNTER — TELEPHONE (OUTPATIENT)
Dept: URGENT CARE | Facility: CLINIC | Age: 47
End: 2022-01-21
Payer: COMMERCIAL

## 2022-01-21 LAB — BACTERIA UR CULT: ABNORMAL

## 2022-01-21 NOTE — TELEPHONE ENCOUNTER
Attempted to call home and mobile phone and no voicemail box setup. Urine culture +E. Coli but treated appropriately with Cipro at time of clinic visit.

## 2022-01-22 ENCOUNTER — TELEPHONE (OUTPATIENT)
Dept: URGENT CARE | Facility: CLINIC | Age: 47
End: 2022-01-22
Payer: COMMERCIAL

## 2022-01-22 NOTE — TELEPHONE ENCOUNTER
Attempt #2 to contact patient. Attempted to call patient on both home and mobile phone. No voicemail set up and home number disconnected. Patient placed on cipro at visit which is sensitive to the ecoli growth in the urine.